# Patient Record
Sex: FEMALE | URBAN - METROPOLITAN AREA
[De-identification: names, ages, dates, MRNs, and addresses within clinical notes are randomized per-mention and may not be internally consistent; named-entity substitution may affect disease eponyms.]

---

## 2020-03-19 ENCOUNTER — APPOINTMENT (RX ONLY)
Dept: URBAN - METROPOLITAN AREA CLINIC 45 | Facility: CLINIC | Age: 35
Setting detail: DERMATOLOGY
End: 2020-03-19

## 2020-03-19 DIAGNOSIS — L50.8 OTHER URTICARIA: ICD-10-CM

## 2020-03-19 DIAGNOSIS — L81.4 OTHER MELANIN HYPERPIGMENTATION: ICD-10-CM

## 2020-03-19 PROCEDURE — ? COUNSELING

## 2020-03-19 PROCEDURE — 99202 OFFICE O/P NEW SF 15 MIN: CPT

## 2020-03-19 PROCEDURE — ? TREATMENT REGIMEN

## 2020-03-19 ASSESSMENT — LOCATION ZONE DERM
LOCATION ZONE: FACE
LOCATION ZONE: FACE

## 2020-03-19 ASSESSMENT — LOCATION DETAILED DESCRIPTION DERM
LOCATION DETAILED: LEFT INFERIOR CENTRAL MALAR CHEEK
LOCATION DETAILED: LEFT INFERIOR NASAL CHEEK
LOCATION DETAILED: LEFT INFERIOR CENTRAL MALAR CHEEK
LOCATION DETAILED: RIGHT INFERIOR CENTRAL MALAR CHEEK
LOCATION DETAILED: RIGHT CENTRAL MALAR CHEEK
LOCATION DETAILED: RIGHT INFERIOR MEDIAL MALAR CHEEK

## 2020-03-19 ASSESSMENT — LOCATION SIMPLE DESCRIPTION DERM
LOCATION SIMPLE: RIGHT CHEEK
LOCATION SIMPLE: LEFT CHEEK
LOCATION SIMPLE: LEFT CHEEK
LOCATION SIMPLE: RIGHT CHEEK

## 2020-03-19 ASSESSMENT — BSA RASH: BSA RASH: 7

## 2020-03-19 ASSESSMENT — SEVERITY ASSESSMENT: SEVERITY: MODERATE TO SEVERE

## 2020-03-19 NOTE — PROCEDURE: TREATMENT REGIMEN
Otc Regimen: Xyzal 5mg 1 bid when she feels hives coming on
Plan: May consider referring to Allergist if Xyzal doesn't control
Detail Level: Zone
Plan: Treatment deferred until treatment for urticaria can be evaluated.

## 2020-03-19 NOTE — HPI: RASH
What Type Of Note Output Would You Prefer (Optional)?: Standard Output
How Severe Is Your Rash?: moderate
Is This A New Presentation, Or A Follow-Up?: Rash
Additional History: Rash recurs about every 6 weeks and lasts 2 weeks. Only gets it on the face, does have bad teeth, and does have appointment for dental work

## 2024-07-26 NOTE — PROCEDURE: REASSURANCE
The skin at the access site was anesthetized. Using a micropuncture needle with the Seldinger technique and ultrasound ([employer]) the right internal jugular vein was succesfully accessed times 1 in an antegrade fashion over the guidewire, under fluoroscopic guidance using a KIT MICROINTRODUCER 5FR 21GA 40CM 7CM .018IN VSI NTNL TUNG. Ultrasound found the vessel was patent.
Detail Level: Detailed
Hide Additional Notes?: No